# Patient Record
Sex: MALE | Race: AMERICAN INDIAN OR ALASKA NATIVE
[De-identification: names, ages, dates, MRNs, and addresses within clinical notes are randomized per-mention and may not be internally consistent; named-entity substitution may affect disease eponyms.]

---

## 2016-12-31 NOTE — EMERGENCY DEPARTMENT REPORT
Chief Complaint: Chest Pain


Stated Complaint: DIFF BREATHING/CHEST PAIN


Time Seen by Provider: 12/31/16 15:52





- HPI


History of Present Illness: 


35 y/o male complain of chest pain and shortness of breath x 2 days .pt state 

that he current of albuterol neb medication .








- ROS


Review of Systems: 


per HPI 








- Exam


Vital Signs: 


 Vital Signs











  12/31/16





  15:36


 


Temperature 99.4 F


 


Pulse Rate 98 H


 


Respiratory 24





Rate 


 


Blood Pressure 158/108


 


O2 Sat by Pulse 97





Oximetry 











Physical Exam: 


GENERAL: The patient is well-developed and well-nourished.pt is obese Patient 

is in NAD. 


HENT: Normocephalic.  Atraumatic.  Patient has moist mucous membranes. Throat: 

No erythema, swelling or exudates. 


EYES:  Extraocular motions are intact, PERRL


NECK: Supple.  No meningitic signs are noted.  There is no adenopathy noted.


CHEST/LUNGS: Clear to auscultation bilaterally. wheezing noted .  There is no 

respiratory distress noted.


HEART/CARDIOVASCULAR: Regular rate and rhythm.  Normal S1 S2.  No murmurs, rubs

, clicks, or gallops.


ABDOMEN: Abdomen is soft, nontender.. Bowel sounds normoactive.  There is no 

abdominal distention. Negative rebound tenderness. Negative Rovsing. Negative 

[place] testing. Negative obturator and psoas signs. Negative CVA tenderness B/

L.


: Deferred.  


SKIN: There is no rash.  There is no edema.  There is no diaphoresis.


NEURO: The patient is  A&Ox3.  The patient has no focal neurologic deficits.


MUSCULOSKELETAL: There is no tenderness or deformity.  There is no limitation 

range of motion. 


PSYCH: Pt has appropriate mood and affect.





MSE screening note: 


Focused history and physical exam performed.


Due to findings the following was ordered:











ED Disposition for MSE


Condition: Stable

## 2017-01-01 NOTE — XRAY REPORT
CHEST TWO VIEWS: 12/31/16 15:26:00



CLINICAL: Chest pain.



COMPARISON: None



FINDINGS: Normal heart and pulmonary vasculature.  The lungs are 

normally expanded and clear.The bones and soft tissues are unremarkable.



IMPRESSION: Normal chest.

## 2017-01-02 NOTE — ED ELOPEMENT REVIEW
ED Pt Elopement review





- Results review


Lab results: 





 Laboratory Tests











  12/31/16 12/31/16





  16:03 16:03


 


WBC  8.6 


 


RBC  5.21 H 


 


Hgb  14.0 


 


Hct  43.2 


 


MCV  83 L 


 


MCH  27 L 


 


MCHC  32 


 


RDW  15.9 H 


 


Plt Count  307 


 


Lymph % (Auto)  21.7 


 


Mono % (Auto)  8.5 H 


 


Eos % (Auto)  7.0 H 


 


Baso % (Auto)  0.5 


 


Lymph #  1.9 


 


Mono #  0.7 


 


Eos #  0.6 H 


 


Baso #  0.0 


 


Seg Neutrophils %  62.3 


 


Seg Neutrophils #  5.3 


 


Sodium   142


 


Potassium   4.8


 


Chloride   101.1


 


Carbon Dioxide   27


 


Anion Gap   19


 


BUN   9


 


Creatinine   0.6 L


 


Estimated GFR   > 60


 


BUN/Creatinine Ratio   15.00


 


Glucose   93


 


Calcium   8.1 L


 


Total Creatine Kinase   264 H


 


CK-MB (CK-2)   4.2 H


 


CK-MB (CK-2) Rel Index   1.5


 


Troponin T   < 0.010














- Call Back decision


Pt Call Back Decision: No action required

## 2020-03-02 ENCOUNTER — HOSPITAL ENCOUNTER (EMERGENCY)
Dept: HOSPITAL 5 - ED | Age: 40
Discharge: HOME | End: 2020-03-02
Payer: COMMERCIAL

## 2020-03-02 VITALS — DIASTOLIC BLOOD PRESSURE: 106 MMHG | SYSTOLIC BLOOD PRESSURE: 153 MMHG

## 2020-03-02 DIAGNOSIS — F12.10: ICD-10-CM

## 2020-03-02 DIAGNOSIS — M54.2: ICD-10-CM
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
## 2020-03-02 DIAGNOSIS — I10: ICD-10-CM

## 2020-03-02 DIAGNOSIS — J45.909: ICD-10-CM

## 2020-03-02 DIAGNOSIS — T78.40XA: Primary | ICD-10-CM

## 2020-03-02 DIAGNOSIS — K11.8: ICD-10-CM

## 2020-03-02 LAB
ALBUMIN SERPL-MCNC: 3.6 G/DL (ref 3.9–5)
ALT SERPL-CCNC: 16 UNITS/L (ref 7–56)
BASOPHILS # (AUTO): 0.1 K/MM3 (ref 0–0.1)
BASOPHILS NFR BLD AUTO: 0.9 % (ref 0–1.8)
BUN SERPL-MCNC: 7 MG/DL (ref 9–20)
BUN/CREAT SERPL: 10 %
CALCIUM SERPL-MCNC: 8.4 MG/DL (ref 8.4–10.2)
EOSINOPHIL # BLD AUTO: 0.4 K/MM3 (ref 0–0.4)
EOSINOPHIL NFR BLD AUTO: 4.8 % (ref 0–4.3)
HCT VFR BLD CALC: 40 % (ref 35.5–45.6)
HEMOLYSIS INDEX: 6
HGB BLD-MCNC: 12.9 GM/DL (ref 11.8–15.2)
LYMPHOCYTES # BLD AUTO: 1.9 K/MM3 (ref 1.2–5.4)
LYMPHOCYTES NFR BLD AUTO: 20 % (ref 13.4–35)
MCHC RBC AUTO-ENTMCNC: 32 % (ref 32–34)
MCV RBC AUTO: 82 FL (ref 84–94)
MONOCYTES # (AUTO): 0.7 K/MM3 (ref 0–0.8)
MONOCYTES % (AUTO): 7.8 % (ref 0–7.3)
PLATELET # BLD: 345 K/MM3 (ref 140–440)
RBC # BLD AUTO: 4.9 M/MM3 (ref 3.65–5.03)

## 2020-03-02 PROCEDURE — 36415 COLL VENOUS BLD VENIPUNCTURE: CPT

## 2020-03-02 PROCEDURE — 94640 AIRWAY INHALATION TREATMENT: CPT

## 2020-03-02 PROCEDURE — 94644 CONT INHLJ TX 1ST HOUR: CPT

## 2020-03-02 PROCEDURE — 87116 MYCOBACTERIA CULTURE: CPT

## 2020-03-02 PROCEDURE — 87430 STREP A AG IA: CPT

## 2020-03-02 PROCEDURE — 96375 TX/PRO/DX INJ NEW DRUG ADDON: CPT

## 2020-03-02 PROCEDURE — 80053 COMPREHEN METABOLIC PANEL: CPT

## 2020-03-02 PROCEDURE — 70450 CT HEAD/BRAIN W/O DYE: CPT

## 2020-03-02 PROCEDURE — 96365 THER/PROPH/DIAG IV INF INIT: CPT

## 2020-03-02 PROCEDURE — 99285 EMERGENCY DEPT VISIT HI MDM: CPT

## 2020-03-02 PROCEDURE — 70491 CT SOFT TISSUE NECK W/DYE: CPT

## 2020-03-02 PROCEDURE — 85025 COMPLETE CBC W/AUTO DIFF WBC: CPT

## 2020-03-02 NOTE — CAT SCAN REPORT
CT NECK WITH CONTRAST



HISTORY: Difficulty swallowing, difficulty breathing



COMPARISON: None.



TECHNIQUE: Routine CT of the neck is performed following intravenous contrast. [facility] dose reduction for ALARA by means of automated exposure control.



CONTRAST: 100 ml of Omnipaque 300 



FINDINGS: Mild prominence of the adenoid and palatine tonsils are noted. No inflammatory changes or a
bscess. The epiglottis and laryngeal vestibule are unremarkable. The upper trachea is widely patent.



The right submandibular gland appears slightly edematous with subtle surrounding fat stranding. No ev
idence for salivary stones or focal lesion. The remaining salivary glands and thyroid gland are unrem
arkable. There are scattered shotty lymph nodes in both jugular chains but no pathologic adenopathy. 
The vascular structures are patent. The bony structures are intact. The lung apices are clear.



IMPRESSION:

Slightly abnormal appearance of the right submandibular gland. Inflammation Please correlate with the
 patient.

Mild tonsillar prominence.

No abscess or mass.



Signer Name: Gwyn Riggins Jr, MD 

Signed: 3/2/2020 3:25 PM

Workstation Name: [id number]

## 2020-03-02 NOTE — EMERGENCY DEPARTMENT REPORT
ED Allergic Reaction HPI





- General


Chief complaint: Headache


Stated complaint: ALLERGIC REACTION


Time Seen by Provider: 03/02/20 11:57


Source: patient


Mode of arrival: Ambulatory


Limitations: No Limitations





- History of Present Illness


Initial Comments: 





Patient is 40 years old male hypertension and asthma.  Patient presented to the 

ER complaining of difficulty swallowing and difficulty breathing associated with

itching in his throat and tingling sensation to the right face since yesterday. 

Patient stated that he is taking hydrochlorothiazide for his blood pressure.  

Patient stated that symptoms started after he drink soda yesterday.  There is no

obvious angioedema noticed.


MD Complaint: allergic reaction


-: Last night


Exposure: unknown


Symptoms: difficulty swallowing, difficulty breathing


Severity: moderate


Treatment Prior to Arrival: benadryl





- Related Data


                                    Allergies











Allergy/AdvReac Type Severity Reaction Status Date / Time


 


No Known Allergies Allergy   Unverified 12/31/16 15:41














ED Review of Systems


ROS: 


Stated complaint: ALLERGIC REACTION


Other details as noted in HPI





Comment: All other systems reviewed and negative


Constitutional: denies: chills, fever


ENT: throat pain


Cardiovascular: denies: chest pain, palpitations


Gastrointestinal: denies: abdominal pain, nausea, vomiting


Musculoskeletal: denies: back pain





ED Past Medical Hx





- Past Medical History


Previous Medical History?: Yes


Hx Hypertension: Yes


Hx Asthma: Yes





- Surgical History


Past Surgical History?: No





- Social History


Smoking Status: Never Smoker


Substance Use Type: Marijuana





ED Physical Exam





- General


Limitations: No Limitations


General appearance: alert, in no apparent distress, anxious





- Head


Head exam: Present: atraumatic, normocephalic, normal inspection





- Eye


Eye exam: Present: normal appearance





- ENT


ENT exam: Present: normal exam, normal orophraynx, mucous membranes moist





- Neck


Neck exam: Present: normal inspection, full ROM.  Absent: tenderness, 

meningismus, lymphadenopathy, thyromegaly





- Respiratory


Respiratory exam: Present: normal lung sounds bilaterally





- Cardiovascular


Cardiovascular Exam: Present: regular rate, normal rhythm, normal heart sounds





- GI/Abdominal


GI/Abdominal exam: Present: soft, normal bowel sounds.  Absent: distended, 

tenderness, guarding, rebound, rigid, organomegaly, mass, bruit, pulsatile mass,

hernia





- Extremities Exam


Extremities exam: Present: normal inspection, full ROM, normal capillary refill.

 Absent: tenderness, pedal edema, calf tenderness





- Back Exam


Back exam: Present: normal inspection, full ROM.  Absent: CVA tenderness (R), 

CVA tenderness (L)





- Neurological Exam


Neurological exam: Present: alert, oriented X3, CN II-XII intact, normal gait, 

reflexes normal





- Psychiatric


Psychiatric exam: Present: normal mood, anxious





- Skin


Skin exam: Present: warm, intact, normal color





ED Course


                                   Vital Signs











  03/02/20 03/02/20 03/02/20





  12:03 14:00 14:52


 


Temperature 98.3 F  


 


Pulse Rate 99 H  85


 


Pulse Rate [  99 H 





Anterior   





Bilateral   





Throughout]   


 


Respiratory 20  19





Rate   


 


Respiratory  18 





Rate [Anterior   





Bilateral   





Throughout]   


 


Blood Pressure 168/110  


 


Blood Pressure   166/104





[Left]   


 


O2 Sat by Pulse 95  94





Oximetry   














ED Medical Decision Making





- Lab Data


Result diagrams: 


                                 03/02/20 12:09





                                 03/02/20 12:09





- Radiology Data


Radiology results: report reviewed





- Medical Decision Making





Patient is 40 years old male hypertension and asthma.  Patient presented to the 

ER complaining of difficulty swallowing and difficulty breathing associated with

 itching in his throat and tingling sensation to the right face since yesterday.

  Patient stated that he is taking hydrochlorothiazide for his blood pressure.  

Patient stated that symptoms started after he drink soda yesterday.  There is no

 obvious angioedema noticed.





Patient received Solu-Medrol, Benadryl, Pepcid and clindamycin.  Patient stated 

that he is feeling much better.  Patient is denying any difficulty swallowing or

 difficulty breathing.  CT neck with IV contrast showed no airway compromise.  

It showed a mild swelling in the right submandibular gland.  No evidence of 

Girish angina.  patient strongly advised to follow-up with his primary care 

physician in the next 2 to 3 days for possible referral to ENT.  Patient also 

advised to return to the ER if he develop any new symptoms or if his symptoms 

get worse.








Critical care attestation.: 


If time is entered above; I have spent that time in minutes in the direct care 

of this critically ill patient, excluding procedure time.








ED Disposition


Clinical Impression: 


 Neck pain, Malignant hypertension, Allergic reaction, Submandibular gland 

swelling





Disposition: DC-01 TO HOME OR SELFCARE


Is pt being admited?: No


Condition: Stable


Instructions:  Hypertension (ED)


Referrals: 


[facility], [place] [Primary Care Provider] - 3-5 Days

## 2020-03-02 NOTE — CAT SCAN REPORT
CT HEAD WITHOUT CONTRAST



INDICATION / CLINICAL INFORMATION:

headache, tingling, elevated BP.



TECHNIQUE:

All CT scans at this location are performed using CT dose reduction for ALARA by means of automated e
xposure control. 



COMPARISON:

None available.



FINDINGS:

HEMORRHAGE: No evidence of intracranial hemorrhage or extra-axial fluid collection.

EXTRA-AXIAL SPACES: Cortical sulci, sylvian fissures and basilar cisterns have an unremarkable appear
ance.

VENTRICULAR SYSTEM: The ventricular system is of normal size and configuration.

CEREBRAL PARENCHYMA: No areas of abnormal brain parenchymal attenuation are identified. There is no i
ndication of recent infarction. 

MIDLINE SHIFT OR HERNIATION: There is no mass effect.

CEREBELLUM / BRAINSTEM: Brainstem and cerebellum have an unremarkable appearance.

INTRACRANIAL VESSELS:No abnormalities are identified on this noncontrast head CT.

ORBITS: visualized portions of the orbits have an unremarkable appearance.

SOFT TISSUES of HEAD: No significant abnormality.

CALVARIUM: Evaluation of bone windows reveals no abnormalities.

PARANASAL SINUSES / MASTOID AIR CELLS: Paranasal sinuses are free from inflammatory mucosal disease. 
Mastoid air cells are normally pneumatized.



ADDITIONAL FINDINGS: None.



IMPRESSION:

1. Normal head CT.



Signer Name: Júnior Merlos MD 

Signed: 3/2/2020 1:16 PM

Workstation Name: [id number]

## 2020-03-02 NOTE — EVENT NOTE
ED Screening Note


ED Screening Note: 





states he feels like he is "having an allergic reaction"


states he feels like he has throat itching, lip swelling and tongue swelling 


does not appear to have obvious angioedema


states the right side of his face is tingling 


states he has a right sided headache 


states this started yesterday


states he took benadryl 


PMHx asthma, HTN, allergies


he is not sure what he takes for his blood pressure


states it has been a few days since he took his medicine














This initial assessment/diagnostic orders/clinical plan/treatment(s) is/are 

subject to change based on patients health status, clinical progression and re-

assessment by fellow clinical providers in the ED. Further treatment and workup 

at subsequent clinical providers discretion. Patient/guardian urged not to elope

from the ED as their condition may be serious if not clinically assessed and 

managed. 





Initial orders include: labs, UA, CT head

## 2020-05-04 ENCOUNTER — HOSPITAL ENCOUNTER (EMERGENCY)
Dept: HOSPITAL 5 - ED | Age: 40
Discharge: HOME | End: 2020-05-04
Payer: COMMERCIAL

## 2020-05-04 VITALS — SYSTOLIC BLOOD PRESSURE: 174 MMHG | DIASTOLIC BLOOD PRESSURE: 124 MMHG

## 2020-05-04 DIAGNOSIS — J45.909: ICD-10-CM
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
## 2020-05-04 DIAGNOSIS — M62.830: ICD-10-CM
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
## 2020-05-04 DIAGNOSIS — I10: ICD-10-CM

## 2020-05-04 DIAGNOSIS — M62.838: ICD-10-CM

## 2020-05-04 DIAGNOSIS — S39.012A: Primary | ICD-10-CM
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
## 2020-05-04 DIAGNOSIS — Z79.899: ICD-10-CM

## 2020-05-04 PROCEDURE — 70450 CT HEAD/BRAIN W/O DYE: CPT

## 2020-05-04 PROCEDURE — 72131 CT LUMBAR SPINE W/O DYE: CPT

## 2020-05-04 PROCEDURE — 72125 CT NECK SPINE W/O DYE: CPT
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
